# Patient Record
Sex: MALE | Race: WHITE | NOT HISPANIC OR LATINO | Employment: OTHER | URBAN - METROPOLITAN AREA
[De-identification: names, ages, dates, MRNs, and addresses within clinical notes are randomized per-mention and may not be internally consistent; named-entity substitution may affect disease eponyms.]

---

## 2018-01-15 ENCOUNTER — OFFICE VISIT (OUTPATIENT)
Dept: PHYSICAL THERAPY | Facility: CLINIC | Age: 58
End: 2018-01-15

## 2018-01-15 ENCOUNTER — TRANSCRIBE ORDERS (OUTPATIENT)
Dept: PHYSICAL THERAPY | Facility: CLINIC | Age: 58
End: 2018-01-15

## 2018-01-15 DIAGNOSIS — M72.0 DUPUYTREN'S CONTRACTURE OF RIGHT HAND: Primary | ICD-10-CM

## 2018-01-15 DIAGNOSIS — M72.0 DUPUYTREN'S DISEASE: Primary | ICD-10-CM

## 2018-01-15 PROCEDURE — L3927 FO PIP DIP NO JT SPR PRE OTS: HCPCS | Performed by: PHYSICAL THERAPIST

## 2018-01-16 NOTE — PROGRESS NOTES
Mandy Resendizbins 1960   Diagnosis/ Surgery: Right Dupuytrens w/ small finger PIPJ contracture               Date Of Injury: Chronic     Date Of Surgery:1/16/18    Hand Dominance: right   History of Present Condition: Pt's condition started several years ago and has vikki getting progressively worse. MD wants to use banana splint to try to decrease flexion contracture   Medical/Vocational History/ Medications: No significant surgical history; pt is a  for the [employer] - frequent typing    Pain: 0/10    Edema: mild  Sensibility: WNL   Wound Status:N/A  ROM/ Strength: NT    Splinting:  · Patient was measure and fit with an OTC banana splint    · Patient was instructed in wearing schedule, precautions and care of the splint during this visit.   · Patient was instructed in proper donning/doffing of splint.   Assessment:  · Patient was fitted and appropriate splint was fabricated this date.  · Patient reported that splint was comfortable and had no complications with the fit of the splint.  · Patient was instructed and patient verbalized understanding of precautions, wear and care of the splint.   · Patient demonstrated independent donning/doffing of splint during treatment today.  Goals:  · Patient was fitted properly with appropriate splint for diagnosis  · Patient was educated on precautions, wear schedule and care of splint  · Patient demonstrated independence with donning/doffing of the splint.  · Splint was provided to Protect Healing Structures, Restrict Mobility, Improve joint alignment.  Plan:  · No additional treatment is required for this patient at this time. The patient is therefore discharged from therapy.  · Patient advised to contact therapist with any additional questions or concerns regarding the fit and function of the splint.  · Patient will be seen for splint issues as needed   · Wear Instructions: Off for hygiene       PT SIGNATURE: Gianluca Domínguez, PT   DATE TREATMENT INITIATED:  1/16/2018    Physician Signature____________________________________ Date____________

## 2022-11-09 ENCOUNTER — TRANSCRIBE ORDERS (OUTPATIENT)
Dept: ADMINISTRATIVE | Facility: HOSPITAL | Age: 62
End: 2022-11-09

## 2022-11-09 ENCOUNTER — HOSPITAL ENCOUNTER (OUTPATIENT)
Dept: GENERAL RADIOLOGY | Facility: HOSPITAL | Age: 62
Discharge: HOME | End: 2022-11-09
Admitting: NURSE PRACTITIONER

## 2022-11-09 DIAGNOSIS — R05.3 CHRONIC COUGH: Primary | ICD-10-CM

## 2022-11-09 PROCEDURE — 71046 X-RAY EXAM CHEST 2 VIEWS: CPT

## 2022-12-27 ENCOUNTER — OFFICE (OUTPATIENT)
Dept: URBAN - METROPOLITAN AREA CLINIC 4 | Facility: CLINIC | Age: 62
End: 2022-12-27

## 2022-12-27 VITALS — WEIGHT: 168 LBS | DIASTOLIC BLOOD PRESSURE: 78 MMHG | HEIGHT: 67 IN | SYSTOLIC BLOOD PRESSURE: 130 MMHG

## 2022-12-27 DIAGNOSIS — R19.8 OTHER SPECIFIED SYMPTOMS AND SIGNS INVOLVING THE DIGESTIVE S: ICD-10-CM

## 2022-12-27 DIAGNOSIS — R68.81 EARLY SATIETY: ICD-10-CM

## 2022-12-27 DIAGNOSIS — R15.0 INCOMPLETE DEFECATION: ICD-10-CM

## 2022-12-27 DIAGNOSIS — R14.0 ABDOMINAL DISTENSION (GASEOUS): ICD-10-CM

## 2022-12-27 DIAGNOSIS — R14.2 ERUCTATION: ICD-10-CM

## 2022-12-27 DIAGNOSIS — R11.0 NAUSEA: ICD-10-CM

## 2022-12-27 DIAGNOSIS — Z80.0 FAMILY HISTORY OF MALIGNANT NEOPLASM OF DIGESTIVE ORGANS: ICD-10-CM

## 2022-12-27 PROCEDURE — 99204 OFFICE O/P NEW MOD 45 MIN: CPT | Performed by: INTERNAL MEDICINE

## 2022-12-29 ENCOUNTER — AMBULATORY SURGICAL CENTER (OUTPATIENT)
Dept: URBAN - METROPOLITAN AREA SURGERY 10 | Facility: SURGERY | Age: 62
End: 2022-12-29

## 2022-12-29 DIAGNOSIS — K57.30 DIVERTICULOSIS OF LARGE INTESTINE WITHOUT PERFORATION OR ABS: ICD-10-CM

## 2022-12-29 DIAGNOSIS — K64.0 FIRST DEGREE HEMORRHOIDS: ICD-10-CM

## 2022-12-29 DIAGNOSIS — Z12.11 ENCOUNTER FOR SCREENING FOR MALIGNANT NEOPLASM OF COLON: ICD-10-CM

## 2022-12-29 PROCEDURE — 45378 DIAGNOSTIC COLONOSCOPY: CPT | Mod: 33 | Performed by: INTERNAL MEDICINE

## 2023-03-28 ENCOUNTER — OFFICE (OUTPATIENT)
Dept: URBAN - METROPOLITAN AREA CLINIC 4 | Facility: CLINIC | Age: 63
End: 2023-03-28

## 2023-03-28 VITALS
WEIGHT: 165 LBS | SYSTOLIC BLOOD PRESSURE: 102 MMHG | DIASTOLIC BLOOD PRESSURE: 70 MMHG | SYSTOLIC BLOOD PRESSURE: 92 MMHG | DIASTOLIC BLOOD PRESSURE: 68 MMHG | HEIGHT: 67 IN

## 2023-03-28 DIAGNOSIS — R10.13 EPIGASTRIC PAIN: ICD-10-CM

## 2023-03-28 DIAGNOSIS — Z80.0 FAMILY HISTORY OF MALIGNANT NEOPLASM OF DIGESTIVE ORGANS: ICD-10-CM

## 2023-03-28 DIAGNOSIS — R14.0 ABDOMINAL DISTENSION (GASEOUS): ICD-10-CM

## 2023-03-28 DIAGNOSIS — K86.81 EXOCRINE PANCREATIC INSUFFICIENCY: ICD-10-CM

## 2023-03-28 DIAGNOSIS — K30 FUNCTIONAL DYSPEPSIA: ICD-10-CM

## 2023-03-28 DIAGNOSIS — R15.0 INCOMPLETE DEFECATION: ICD-10-CM

## 2023-03-28 DIAGNOSIS — R10.12 LEFT UPPER QUADRANT PAIN: ICD-10-CM

## 2023-03-28 DIAGNOSIS — R19.8 OTHER SPECIFIED SYMPTOMS AND SIGNS INVOLVING THE DIGESTIVE S: ICD-10-CM

## 2023-03-28 PROCEDURE — 99214 OFFICE O/P EST MOD 30 MIN: CPT | Performed by: INTERNAL MEDICINE

## 2023-03-28 RX ORDER — RIFAXIMIN 550 MG/1
1650 TABLET ORAL
Qty: 42 | Refills: 2 | Status: ACTIVE
Start: 2023-03-28

## 2023-03-29 ENCOUNTER — TRANSCRIBE ORDERS (OUTPATIENT)
Dept: NUTRITION | Facility: HOSPITAL | Age: 63
End: 2023-03-29
Payer: COMMERCIAL

## 2023-03-29 DIAGNOSIS — K30 STOMACH UPSET: ICD-10-CM

## 2023-03-29 DIAGNOSIS — R14.0 BLOATING: ICD-10-CM

## 2023-03-29 DIAGNOSIS — R10.12 ABDOMINAL PAIN, LEFT UPPER QUADRANT: Primary | ICD-10-CM

## 2023-06-05 ENCOUNTER — HOSPITAL ENCOUNTER (OUTPATIENT)
Dept: NUTRITION | Facility: HOSPITAL | Age: 63
Setting detail: RECURRING SERIES
Discharge: HOME | End: 2023-06-05

## 2023-06-05 VITALS — WEIGHT: 165 LBS | BODY MASS INDEX: 25.9 KG/M2 | HEIGHT: 67 IN

## 2023-06-05 PROCEDURE — 97802 MEDICAL NUTRITION INDIV IN: CPT

## 2023-10-05 ENCOUNTER — OFFICE (OUTPATIENT)
Dept: URBAN - METROPOLITAN AREA CLINIC 4 | Facility: CLINIC | Age: 63
End: 2023-10-05

## 2023-10-05 VITALS — HEIGHT: 67 IN | WEIGHT: 180 LBS | SYSTOLIC BLOOD PRESSURE: 128 MMHG | DIASTOLIC BLOOD PRESSURE: 84 MMHG

## 2023-10-05 DIAGNOSIS — K86.81 EXOCRINE PANCREATIC INSUFFICIENCY: ICD-10-CM

## 2023-10-05 DIAGNOSIS — K58.0 IRRITABLE BOWEL SYNDROME WITH DIARRHEA: ICD-10-CM

## 2023-10-05 DIAGNOSIS — K63.89 OTHER SPECIFIED DISEASES OF INTESTINE: ICD-10-CM

## 2023-10-05 DIAGNOSIS — Z86.010 PERSONAL HISTORY OF COLONIC POLYPS: ICD-10-CM

## 2023-10-05 PROCEDURE — 99213 OFFICE O/P EST LOW 20 MIN: CPT | Performed by: INTERNAL MEDICINE
